# Patient Record
Sex: FEMALE
[De-identification: names, ages, dates, MRNs, and addresses within clinical notes are randomized per-mention and may not be internally consistent; named-entity substitution may affect disease eponyms.]

---

## 2017-03-15 PROBLEM — Z00.00 ENCOUNTER FOR PREVENTIVE HEALTH EXAMINATION: Noted: 2017-03-15

## 2017-03-17 ENCOUNTER — APPOINTMENT (OUTPATIENT)
Dept: COLORECTAL SURGERY | Facility: CLINIC | Age: 67
End: 2017-03-17

## 2017-07-21 ENCOUNTER — OUTPATIENT (OUTPATIENT)
Dept: OUTPATIENT SERVICES | Facility: HOSPITAL | Age: 67
LOS: 1 days | End: 2017-07-21
Payer: MEDICARE

## 2017-07-21 ENCOUNTER — TRANSCRIPTION ENCOUNTER (OUTPATIENT)
Age: 67
End: 2017-07-21

## 2017-07-21 DIAGNOSIS — Z12.11 ENCOUNTER FOR SCREENING FOR MALIGNANT NEOPLASM OF COLON: ICD-10-CM

## 2017-07-21 PROCEDURE — T1013: CPT

## 2017-07-21 PROCEDURE — 45378 DIAGNOSTIC COLONOSCOPY: CPT

## 2018-03-01 ENCOUNTER — OUTPATIENT (OUTPATIENT)
Dept: OUTPATIENT SERVICES | Facility: HOSPITAL | Age: 68
LOS: 1 days | End: 2018-03-01
Payer: MEDICAID

## 2018-03-01 PROCEDURE — G9001: CPT

## 2018-03-02 ENCOUNTER — INPATIENT (INPATIENT)
Facility: HOSPITAL | Age: 68
LOS: 0 days | Discharge: HOME | DRG: 312 | End: 2018-03-03
Attending: HOSPITALIST | Admitting: HOSPITALIST
Payer: MEDICARE

## 2018-03-02 VITALS
RESPIRATION RATE: 19 BRPM | HEIGHT: 60 IN | TEMPERATURE: 98 F | HEART RATE: 101 BPM | SYSTOLIC BLOOD PRESSURE: 101 MMHG | DIASTOLIC BLOOD PRESSURE: 73 MMHG | WEIGHT: 126.1 LBS | OXYGEN SATURATION: 94 %

## 2018-03-02 DIAGNOSIS — R55 SYNCOPE AND COLLAPSE: ICD-10-CM

## 2018-03-02 DIAGNOSIS — Z90.710 ACQUIRED ABSENCE OF BOTH CERVIX AND UTERUS: Chronic | ICD-10-CM

## 2018-03-02 LAB
ALBUMIN SERPL ELPH-MCNC: 4.1 G/DL — SIGNIFICANT CHANGE UP (ref 3.3–5.2)
ALP SERPL-CCNC: 66 U/L — SIGNIFICANT CHANGE UP (ref 40–120)
ALT FLD-CCNC: 12 U/L — SIGNIFICANT CHANGE UP
ANION GAP SERPL CALC-SCNC: 15 MMOL/L — SIGNIFICANT CHANGE UP (ref 5–17)
AST SERPL-CCNC: 19 U/L — SIGNIFICANT CHANGE UP
BASOPHILS # BLD AUTO: 0 K/UL — SIGNIFICANT CHANGE UP (ref 0–0.2)
BASOPHILS NFR BLD AUTO: 0.1 % — SIGNIFICANT CHANGE UP (ref 0–2)
BILIRUB SERPL-MCNC: 0.7 MG/DL — SIGNIFICANT CHANGE UP (ref 0.4–2)
BUN SERPL-MCNC: 11 MG/DL — SIGNIFICANT CHANGE UP (ref 8–20)
CALCIUM SERPL-MCNC: 8.9 MG/DL — SIGNIFICANT CHANGE UP (ref 8.6–10.2)
CHLORIDE SERPL-SCNC: 94 MMOL/L — LOW (ref 98–107)
CK SERPL-CCNC: 77 U/L — SIGNIFICANT CHANGE UP (ref 25–170)
CO2 SERPL-SCNC: 23 MMOL/L — SIGNIFICANT CHANGE UP (ref 22–29)
CREAT SERPL-MCNC: 0.57 MG/DL — SIGNIFICANT CHANGE UP (ref 0.5–1.3)
EOSINOPHIL # BLD AUTO: 0 K/UL — SIGNIFICANT CHANGE UP (ref 0–0.5)
EOSINOPHIL NFR BLD AUTO: 0 % — SIGNIFICANT CHANGE UP (ref 0–6)
GLUCOSE SERPL-MCNC: 108 MG/DL — SIGNIFICANT CHANGE UP (ref 70–115)
HCT VFR BLD CALC: 42 % — SIGNIFICANT CHANGE UP (ref 37–47)
HGB BLD-MCNC: 14.6 G/DL — SIGNIFICANT CHANGE UP (ref 12–16)
LYMPHOCYTES # BLD AUTO: 0.6 K/UL — LOW (ref 1–4.8)
LYMPHOCYTES # BLD AUTO: 6.2 % — LOW (ref 20–55)
MCHC RBC-ENTMCNC: 29.6 PG — SIGNIFICANT CHANGE UP (ref 27–31)
MCHC RBC-ENTMCNC: 34.8 G/DL — SIGNIFICANT CHANGE UP (ref 32–36)
MCV RBC AUTO: 85.2 FL — SIGNIFICANT CHANGE UP (ref 81–99)
MONOCYTES # BLD AUTO: 0.7 K/UL — SIGNIFICANT CHANGE UP (ref 0–0.8)
MONOCYTES NFR BLD AUTO: 6.4 % — SIGNIFICANT CHANGE UP (ref 3–10)
NEUTROPHILS # BLD AUTO: 9.1 K/UL — HIGH (ref 1.8–8)
NEUTROPHILS NFR BLD AUTO: 86.9 % — HIGH (ref 37–73)
NT-PROBNP SERPL-SCNC: 180 PG/ML — SIGNIFICANT CHANGE UP (ref 0–300)
PLATELET # BLD AUTO: 194 K/UL — SIGNIFICANT CHANGE UP (ref 150–400)
POTASSIUM SERPL-MCNC: 3.8 MMOL/L — SIGNIFICANT CHANGE UP (ref 3.5–5.3)
POTASSIUM SERPL-SCNC: 3.8 MMOL/L — SIGNIFICANT CHANGE UP (ref 3.5–5.3)
PROT SERPL-MCNC: 7.2 G/DL — SIGNIFICANT CHANGE UP (ref 6.6–8.7)
RBC # BLD: 4.93 M/UL — SIGNIFICANT CHANGE UP (ref 4.4–5.2)
RBC # FLD: 12.8 % — SIGNIFICANT CHANGE UP (ref 11–15.6)
SODIUM SERPL-SCNC: 132 MMOL/L — LOW (ref 135–145)
TROPONIN T SERPL-MCNC: <0.01 NG/ML — SIGNIFICANT CHANGE UP (ref 0–0.06)
WBC # BLD: 10.4 K/UL — SIGNIFICANT CHANGE UP (ref 4.8–10.8)
WBC # FLD AUTO: 10.4 K/UL — SIGNIFICANT CHANGE UP (ref 4.8–10.8)

## 2018-03-02 PROCEDURE — 70450 CT HEAD/BRAIN W/O DYE: CPT | Mod: 26

## 2018-03-02 PROCEDURE — 93010 ELECTROCARDIOGRAM REPORT: CPT

## 2018-03-02 PROCEDURE — 71045 X-RAY EXAM CHEST 1 VIEW: CPT | Mod: 26

## 2018-03-02 PROCEDURE — 99285 EMERGENCY DEPT VISIT HI MDM: CPT | Mod: 25

## 2018-03-02 PROCEDURE — 93306 TTE W/DOPPLER COMPLETE: CPT | Mod: 26

## 2018-03-02 PROCEDURE — 93880 EXTRACRANIAL BILAT STUDY: CPT | Mod: 26

## 2018-03-02 PROCEDURE — 99223 1ST HOSP IP/OBS HIGH 75: CPT

## 2018-03-02 RX ORDER — SODIUM CHLORIDE 9 MG/ML
1000 INJECTION INTRAMUSCULAR; INTRAVENOUS; SUBCUTANEOUS
Qty: 0 | Refills: 0 | Status: DISCONTINUED | OUTPATIENT
Start: 2018-03-02 | End: 2018-03-03

## 2018-03-02 RX ORDER — OXYBUTYNIN CHLORIDE 5 MG
1 TABLET ORAL
Qty: 0 | Refills: 0 | COMMUNITY

## 2018-03-02 RX ORDER — OXYBUTYNIN CHLORIDE 5 MG
5 TABLET ORAL
Qty: 0 | Refills: 0 | Status: DISCONTINUED | OUTPATIENT
Start: 2018-03-02 | End: 2018-03-03

## 2018-03-02 RX ORDER — SODIUM CHLORIDE 9 MG/ML
3 INJECTION INTRAMUSCULAR; INTRAVENOUS; SUBCUTANEOUS ONCE
Qty: 0 | Refills: 0 | Status: COMPLETED | OUTPATIENT
Start: 2018-03-02 | End: 2018-03-02

## 2018-03-02 RX ADMIN — Medication 5 MILLIGRAM(S): at 19:17

## 2018-03-02 RX ADMIN — SODIUM CHLORIDE 125 MILLILITER(S): 9 INJECTION INTRAMUSCULAR; INTRAVENOUS; SUBCUTANEOUS at 15:39

## 2018-03-02 RX ADMIN — SODIUM CHLORIDE 125 MILLILITER(S): 9 INJECTION INTRAMUSCULAR; INTRAVENOUS; SUBCUTANEOUS at 21:11

## 2018-03-02 RX ADMIN — SODIUM CHLORIDE 3 MILLILITER(S): 9 INJECTION INTRAMUSCULAR; INTRAVENOUS; SUBCUTANEOUS at 09:13

## 2018-03-02 NOTE — ED ADULT NURSE NOTE - OBJECTIVE STATEMENT
Pt care assumed at 0915, presents to ED A&Ox3 c/o syncopal episode this AM. Pt reports she was getting up off the toilet to grab a roll of toilet paper and felt shaky, states she passed out. Pt denies hitting head, blurry vision, or weakness. No obvious trauma or injury present. Pt also reports intermittent left sided chest pain, non radiating x2 months. Denies having active CP or SOB today. Pt states " I had a tube put in my bladder this week by Dr. Heredia but it was taken out." Pt currently taking oxybutynin and cipro, prescribed by Dr. Heredia. Pt in no apparent distress at this time, respirations even and unlabored. Pt rec'd with #20g in place to right ac, intact and patent, lab results pending. Will continue to monitor and reassess.

## 2018-03-02 NOTE — H&P ADULT - HISTORY OF PRESENT ILLNESS
67 yof with pmh of bladder spasms presents from home with syncope for a few minutes. Patient states she was home alone and was urinating in the bathroom and stood up and felt dizzy. Patient then started walking towards another room and fainted for an unknown period of time. Patient denies any chest pain, head trauma or recent illnesess. Patient states that this has never happened to her before. Patient states she did not eat breakfast this morning. Patient seen at bedside and the only complaint is headache.     ct head - negative  ekg - negative

## 2018-03-02 NOTE — ED ADULT NURSE REASSESSMENT NOTE - NS ED NURSE REASSESS COMMENT FT1
Pt resting comfortably on stretcher, in no apparent distress, pending CT results. Will continue to monitor and reasses.

## 2018-03-02 NOTE — ED ADULT NURSE REASSESSMENT NOTE - NS ED NURSE REASSESS COMMENT FT1
Assuming care from previous RN, pt A&O x's 4, resp even and unlabored, LS clear and equal B/L, showing NSR on monitor, denies pain, denies dizziness, offers no complaints at this time, pt with patent 20G IV tolerating 125mL NS well, pt ambulates without difficulty, pt placed on transport cardiac monitor and awaiting transport to admit bed

## 2018-03-02 NOTE — ED ADULT NURSE REASSESSMENT NOTE - NS ED NURSE REASSESS COMMENT FT1
Pt returned from sono, resting comfortably on stretcher, in no apparent distress. Pt denies any pain or discomfort at this time, offers no questions or complaints, respirations even and unlabored. Will continue to monitor and reassess.

## 2018-03-02 NOTE — ED ADULT TRIAGE NOTE - CHIEF COMPLAINT QUOTE
was in the bathroom and reached for toilet paper and felt dizzy and fell, thinks she passed out, happ , 1 1/2 hr ago

## 2018-03-02 NOTE — H&P ADULT - ASSESSMENT
1) Syncope --> likely orthostatic hypotension   --> admit to monitored bed  --> will consult cardio  --> send trops in am  --> tte and carotids  --> fluids    2) Bladder spasms --> oxybutynin  --> urinalysis and urine culture     3) diet --> regular diet    4) pt consult

## 2018-03-02 NOTE — ED PROVIDER NOTE - OBJECTIVE STATEMENT
Patient is a 67 year old female c/o syncope this AM. Patient states that she stood up from the toilet to change the toilet paper and passed out. She states that her "body felt shaky" and that her vision went yellow. Patient also c/o L-sided intermittent CP x 2 months which she attributes to gas. Also c/o H/A x 2 weeks including this AM prior to syncope. Patient reports that she went to the urologist last week for vaginal dryness and he performed an exam where he "put water into the bladder" and she has been having trouble urinating since Wednesday. She was prescribed oxybutinin and cipro. Denies head injury, blurry vision, SOB, dizziness, palpitations, abdominal pain, paresthesias.

## 2018-03-02 NOTE — H&P ADULT - NSHPPHYSICALEXAM_GEN_ALL_CORE
PHYSICAL EXAM:    GENERAL: NAD, well-groomed,   HEAD:  Atraumatic, Normocephalic  EYES: EOMI, PERRLA,   ENMT: No tonsillar erythema, exudates, or enlargement; Moist mucous membranes,   NECK: Supple, No JVD, Normal thyroid  NERVOUS SYSTEM:  Alert & Oriented X3, Good concentration;  CHEST/LUNG: Clear to percussion bilaterally; No rales  HEART: Regular rate and rhythm; No murmurs,  ABDOMEN: Soft, Nontender, Nondistended;  EXTREMITIES:  2+ Peripheral Pulses, No edema  LYMPH: No lymphadenopathy noted  SKIN: No rashes or lesions

## 2018-03-02 NOTE — H&P ADULT - NSHPLABSRESULTS_GEN_ALL_CORE
14.6   10.4   )----------(  194       ( 02 Mar 2018 08:57 )               42.0      132    |  94     |  11.0   ----------------------------<  108        ( 02 Mar 2018 08:57 )  3.8     |  23.0   |  0.57     Ca    8.9        ( 02 Mar 2018 08:57 )    TPro  7.2    /  Alb  4.1    /  TBili  0.7    /  DBili  x      /  AST  19     /  ALT  12     /  AlkPhos  66     ( 02 Mar 2018 08:57 )    LIVER FUNCTIONS - ( 02 Mar 2018 08:57 )  Alb: 4.1 g/dL / Pro: 7.2 g/dL / ALK PHOS: 66 U/L / ALT: 12 U/L / AST: 19 U/L / GGT: x               CAPILLARY BLOOD GLUCOSE    CARDIAC MARKERS ( 02 Mar 2018 08:57 )  x     / <0.01 ng/mL / 77 U/L / x     / x          ekg no st changes

## 2018-03-02 NOTE — ED ADULT NURSE REASSESSMENT NOTE - NS ED NURSE REASSESS COMMENT FT1
Patient continues to rest in bed at this time with family at the bedside. Patient denies any pain at this time. MD at the bedside with patient. NAD noted. Updated on progress.

## 2018-03-03 ENCOUNTER — TRANSCRIPTION ENCOUNTER (OUTPATIENT)
Age: 68
End: 2018-03-03

## 2018-03-03 VITALS
TEMPERATURE: 98 F | DIASTOLIC BLOOD PRESSURE: 69 MMHG | HEART RATE: 73 BPM | OXYGEN SATURATION: 98 % | SYSTOLIC BLOOD PRESSURE: 102 MMHG | RESPIRATION RATE: 17 BRPM

## 2018-03-03 LAB
APPEARANCE UR: CLEAR — SIGNIFICANT CHANGE UP
BACTERIA # UR AUTO: ABNORMAL
BILIRUB UR-MCNC: NEGATIVE — SIGNIFICANT CHANGE UP
COLOR SPEC: YELLOW — SIGNIFICANT CHANGE UP
DIFF PNL FLD: ABNORMAL
EPI CELLS # UR: SIGNIFICANT CHANGE UP
GLUCOSE UR QL: NEGATIVE MG/DL — SIGNIFICANT CHANGE UP
KETONES UR-MCNC: ABNORMAL
LEUKOCYTE ESTERASE UR-ACNC: ABNORMAL
NITRITE UR-MCNC: POSITIVE
PH UR: 6.5 — SIGNIFICANT CHANGE UP (ref 5–8)
PROCALCITONIN SERPL-MCNC: 0.07 NG/ML — HIGH (ref 0–0.04)
PROT UR-MCNC: 30 MG/DL
RBC CASTS # UR COMP ASSIST: ABNORMAL /HPF (ref 0–4)
SP GR SPEC: 1.01 — SIGNIFICANT CHANGE UP (ref 1.01–1.02)
TROPONIN T SERPL-MCNC: <0.01 NG/ML — SIGNIFICANT CHANGE UP (ref 0–0.06)
UROBILINOGEN FLD QL: NEGATIVE MG/DL — SIGNIFICANT CHANGE UP
WBC UR QL: ABNORMAL

## 2018-03-03 PROCEDURE — 83880 ASSAY OF NATRIURETIC PEPTIDE: CPT

## 2018-03-03 PROCEDURE — 99239 HOSP IP/OBS DSCHRG MGMT >30: CPT

## 2018-03-03 PROCEDURE — T1013: CPT

## 2018-03-03 PROCEDURE — 87086 URINE CULTURE/COLONY COUNT: CPT

## 2018-03-03 PROCEDURE — 87186 SC STD MICRODIL/AGAR DIL: CPT

## 2018-03-03 PROCEDURE — 84145 PROCALCITONIN (PCT): CPT

## 2018-03-03 PROCEDURE — 36415 COLL VENOUS BLD VENIPUNCTURE: CPT

## 2018-03-03 PROCEDURE — 71045 X-RAY EXAM CHEST 1 VIEW: CPT

## 2018-03-03 PROCEDURE — 99285 EMERGENCY DEPT VISIT HI MDM: CPT | Mod: 25

## 2018-03-03 PROCEDURE — 85027 COMPLETE CBC AUTOMATED: CPT

## 2018-03-03 PROCEDURE — 93005 ELECTROCARDIOGRAM TRACING: CPT

## 2018-03-03 PROCEDURE — 82550 ASSAY OF CK (CPK): CPT

## 2018-03-03 PROCEDURE — 70450 CT HEAD/BRAIN W/O DYE: CPT

## 2018-03-03 PROCEDURE — 93306 TTE W/DOPPLER COMPLETE: CPT

## 2018-03-03 PROCEDURE — 93880 EXTRACRANIAL BILAT STUDY: CPT

## 2018-03-03 PROCEDURE — 80053 COMPREHEN METABOLIC PANEL: CPT

## 2018-03-03 PROCEDURE — 84484 ASSAY OF TROPONIN QUANT: CPT

## 2018-03-03 PROCEDURE — 93306 TTE W/DOPPLER COMPLETE: CPT | Mod: 26

## 2018-03-03 PROCEDURE — 81001 URINALYSIS AUTO W/SCOPE: CPT

## 2018-03-03 RX ORDER — CEFTRIAXONE 500 MG/1
INJECTION, POWDER, FOR SOLUTION INTRAMUSCULAR; INTRAVENOUS
Qty: 0 | Refills: 0 | Status: DISCONTINUED | OUTPATIENT
Start: 2018-03-03 | End: 2018-03-03

## 2018-03-03 RX ORDER — CEFTRIAXONE 500 MG/1
1 INJECTION, POWDER, FOR SOLUTION INTRAMUSCULAR; INTRAVENOUS EVERY 24 HOURS
Qty: 0 | Refills: 0 | Status: DISCONTINUED | OUTPATIENT
Start: 2018-03-04 | End: 2018-03-03

## 2018-03-03 RX ORDER — MOXIFLOXACIN HYDROCHLORIDE TABLETS, 400 MG 400 MG/1
1 TABLET, FILM COATED ORAL
Qty: 0 | Refills: 0 | COMMUNITY

## 2018-03-03 RX ORDER — CEFTRIAXONE 500 MG/1
1 INJECTION, POWDER, FOR SOLUTION INTRAMUSCULAR; INTRAVENOUS ONCE
Qty: 0 | Refills: 0 | Status: COMPLETED | OUTPATIENT
Start: 2018-03-03 | End: 2018-03-03

## 2018-03-03 RX ORDER — SACCHAROMYCES BOULARDII 250 MG
250 POWDER IN PACKET (EA) ORAL
Qty: 0 | Refills: 0 | Status: DISCONTINUED | OUTPATIENT
Start: 2018-03-03 | End: 2018-03-03

## 2018-03-03 RX ADMIN — Medication 5 MILLIGRAM(S): at 06:37

## 2018-03-03 RX ADMIN — SODIUM CHLORIDE 125 MILLILITER(S): 9 INJECTION INTRAMUSCULAR; INTRAVENOUS; SUBCUTANEOUS at 10:31

## 2018-03-03 RX ADMIN — Medication 5 MILLIGRAM(S): at 16:55

## 2018-03-03 RX ADMIN — CEFTRIAXONE 100 GRAM(S): 500 INJECTION, POWDER, FOR SOLUTION INTRAMUSCULAR; INTRAVENOUS at 10:30

## 2018-03-03 NOTE — DISCHARGE NOTE ADULT - MEDICATION SUMMARY - MEDICATIONS TO STOP TAKING
I will STOP taking the medications listed below when I get home from the hospital:    Zofran ODT 4 mg oral tablet, disintegrating  -- 1 tab(s) by mouth 3 times a day    Cipro 500 mg oral tablet  -- 1 tab(s) by mouth every 12 hours

## 2018-03-03 NOTE — DISCHARGE NOTE ADULT - PATIENT PORTAL LINK FT
You can access the [facility] Patient Portal, offered by [facility], by registering with the following website: [email or web address]

## 2018-03-03 NOTE — DISCHARGE NOTE ADULT - MEDICATION SUMMARY - MEDICATIONS TO TAKE
I will START or STAY ON the medications listed below when I get home from the hospital:    Levaquin 750 mg oral tablet  -- 1 tab(s) by mouth every 24 hours   -- Avoid prolonged or excessive exposure to direct and/or artificial sunlight while taking this medication.  Do not take dairy products, antacids, or iron preparations within one hour of this medication.  Finish all this medication unless otherwise directed by prescriber.  May cause drowsiness or dizziness.  Medication should be taken with plenty of water.    -- Indication: For uti    oxybutynin 5 mg oral tablet  -- 1 tab(s) by mouth 2 times a day  -- Indication: For bladder spasms

## 2018-03-03 NOTE — DISCHARGE NOTE ADULT - HOSPITAL COURSE
67 yof with pmh of bladder spasms presents from home with syncope for a few minutes. Patient states she was home alone and was urinating in the bathroom and stood up and felt dizzy. Patient then started walking towards another room and fainted for an unknown period of time. Patient denies any chest pain, head trauma or recent illnesess. Patient states that this has never happened to her before. Patient states she did not eat breakfast this morning. Patient seen at bedside and the only complaint is headache.     ct head - negative  ekg - negative    patient admitted to medicine and seen by cardio. patient had carotids and tte. Patient had pos ua and started on iv abx. Patient is now ready for dc home    time spent on dc 32 minutes

## 2018-03-03 NOTE — DISCHARGE NOTE ADULT - CARE PLAN
Principal Discharge DX:	Syncope and collapse  Goal:	tte and carotids negative  Assessment and plan of treatment:	follow up with cardio and pcp  Secondary Diagnosis:	UTI (urinary tract infection)  Goal:	dc with po abx  Secondary Diagnosis:	Bladder spasm  Goal:	home meds

## 2018-03-03 NOTE — DISCHARGE NOTE ADULT - CARE PROVIDER_API CALL
Mert In Juan CLIFTON), Medicine  [address] [place] [address]  Phone: [phone]  Fax: [phone]    Devi Kasper (JESSIE), Cardiovascular Disease; Internal Medicine; Nuclear Cardiology  [address] [place] [address]  Phone: [phone]  Fax: [phone]    Cristino Heredia), Urology  [address] [place] [id number]  Phone: [phone]  Fax: [phone]

## 2018-03-19 DIAGNOSIS — R69 ILLNESS, UNSPECIFIED: ICD-10-CM

## 2019-06-24 PROBLEM — K76.89 OTHER SPECIFIED DISEASES OF LIVER: Chronic | Status: ACTIVE | Noted: 2018-03-02

## 2019-07-11 ENCOUNTER — APPOINTMENT (OUTPATIENT)
Dept: VASCULAR SURGERY | Facility: CLINIC | Age: 69
End: 2019-07-11

## 2022-02-07 NOTE — CONSULT NOTE ADULT - SUBJECTIVE AND OBJECTIVE BOX
[place] [facility] - [facility]                                   [address] [place] [address]                                                      PHONE: [phone]                                                         FAX: [phone]  [email or web address]  ---------------------------------------------------------------------------------------------------------------------------------    67y Female with past medical history as under presenting after a syncopal episode at home. Used bathroom and as she was walking out she passed out. Denies cp/sob. No previous history of syncope.     PAST MEDICAL & SURGICAL HISTORY:  Hepatic cyst  Osteoporosis  S/P hysterectomy      No Known Allergies      MEDICATIONS  (STANDING):  oxybutynin 5 milliGRAM(s) Oral two times a day  sodium chloride 0.9%. 1000 milliLiter(s) (125 mL/Hr) IV Continuous <Continuous>    MEDICATIONS  (PRN):      Social History:  No smoking   No alcohol  No     ROS: Negative other than as mentioned in HPI.    Vital Signs Last 24 Hrs  T(C): 37.3 (02 Mar 2018 13:59), Max: 37.8 (02 Mar 2018 09:38)  T(F): 99.2 (02 Mar 2018 13:59), Max: 100 (02 Mar 2018 09:38)  HR: 76 (02 Mar 2018 13:59) (76 - 101)  BP: 110/76 (02 Mar 2018 13:59) (101/73 - 110/76)  BP(mean): --  RR: 18 (02 Mar 2018 13:59) (18 - 19)  SpO2: 99% (02 Mar 2018 13:59) (94% - 99%)  ICU Vital Signs Last 24 Hrs  SERGIO BUCK  I&O's Detail    I&O's Summary    Drug Dosing Weight  SERGIO BUCK      PHYSICAL EXAM:    HEENT:  No JVD  CARDIOVASCULAR: Normal S1 and S2, No murmur, rub, lift.   LUNGS: No rales, rhonchi or wheeze. Normal breath sounds bilaterally.  ABDOMEN: Soft, nontender without mass or organomegaly. bowel sounds normoactive.  EXTREMITIES: No clubbing, cyanosis or edema          LABS:                        14.6   10.4  )-----------( 194      ( 02 Mar 2018 08:57 )             42.0     03-02    132<L>  |  94<L>  |  11.0  ----------------------------<  108  3.8   |  23.0  |  0.57    Ca    8.9      02 Mar 2018 08:57    TPro  7.2  /  Alb  4.1  /  TBili  0.7  /  DBili  x   /  AST  19  /  ALT  12  /  AlkPhos  66  03-02    SERGIO BUCK  CARDIAC MARKERS ( 02 Mar 2018 08:57 )  x     / <0.01 ng/mL / 77 U/L / x     / x          ecg: nsr with poor r wave progression  Carotid u/s: negative      Assessment and Plan:  In summary, SERGIO BUCK is an 67y Female with no significant past medical history presenting after a syncopal episode at home. Used bathroom and as she was walking out she passed out. Denies cp/sob. No previous history of syncope.   Syncope: continue on tele. will check echo [place] [facility] - [facility]                                   [address] [place] [address]                                                      PHONE: [phone]                                                         FAX: [phone]  [email or web address]  ---------------------------------------------------------------------------------------------------------------------------------    67y Female with past medical history as under presenting after a syncopal episode at home. Used bathroom and as she was walking out she passed out. Denies cp/sob. No previous history of syncope.     PAST MEDICAL & SURGICAL HISTORY:  Hepatic cyst  Osteoporosis  S/P hysterectomy      No Known Allergies      MEDICATIONS  (STANDING):  oxybutynin 5 milliGRAM(s) Oral two times a day  sodium chloride 0.9%. 1000 milliLiter(s) (125 mL/Hr) IV Continuous <Continuous>    MEDICATIONS  (PRN):      Social History:  No smoking   No alcohol  No     ROS: Negative other than as mentioned in HPI.    Vital Signs Last 24 Hrs  T(C): 37.3 (02 Mar 2018 13:59), Max: 37.8 (02 Mar 2018 09:38)  T(F): 99.2 (02 Mar 2018 13:59), Max: 100 (02 Mar 2018 09:38)  HR: 76 (02 Mar 2018 13:59) (76 - 101)  BP: 110/76 (02 Mar 2018 13:59) (101/73 - 110/76)  BP(mean): --  RR: 18 (02 Mar 2018 13:59) (18 - 19)  SpO2: 99% (02 Mar 2018 13:59) (94% - 99%)  ICU Vital Signs Last 24 Hrs  SERGIO BUCK  I&O's Detail    I&O's Summary    Drug Dosing Weight  SERGIO BUCK      PHYSICAL EXAM:    HEENT:  No JVD  CARDIOVASCULAR: Normal S1 and S2, No murmur, rub, lift.   LUNGS: No rales, rhonchi or wheeze. Normal breath sounds bilaterally.  ABDOMEN: Soft, nontender without mass or organomegaly. bowel sounds normoactive.  EXTREMITIES: No clubbing, cyanosis or edema          LABS:                        14.6   10.4  )-----------( 194      ( 02 Mar 2018 08:57 )             42.0     03-02    132<L>  |  94<L>  |  11.0  ----------------------------<  108  3.8   |  23.0  |  0.57    Ca    8.9      02 Mar 2018 08:57    TPro  7.2  /  Alb  4.1  /  TBili  0.7  /  DBili  x   /  AST  19  /  ALT  12  /  AlkPhos  66  03-02    SERGIO BUCK  CARDIAC MARKERS ( 02 Mar 2018 08:57 )  x     / <0.01 ng/mL / 77 U/L / x     / x          ecg: nsr with poor r wave progression  Carotid u/s: negative  Assessment and Plan:  In summary, SERGIO BUCK is an 67y Female with no significant past medical history presenting after a syncopal episode at home. As she was walking out of the bath room she passed out. Denies cp/sob. No previous history of syncope.   Syncope: continue on tele. will check echo With patient

## 2022-02-14 ENCOUNTER — APPOINTMENT (OUTPATIENT)
Dept: OBGYN | Facility: CLINIC | Age: 72
End: 2022-02-14

## 2022-09-21 ENCOUNTER — APPOINTMENT (OUTPATIENT)
Dept: OBGYN | Facility: CLINIC | Age: 72
End: 2022-09-21

## 2022-09-21 VITALS
HEIGHT: 60 IN | BODY MASS INDEX: 24.74 KG/M2 | WEIGHT: 126 LBS | SYSTOLIC BLOOD PRESSURE: 130 MMHG | DIASTOLIC BLOOD PRESSURE: 80 MMHG

## 2022-09-21 DIAGNOSIS — Z12.4 ENCOUNTER FOR SCREENING FOR MALIGNANT NEOPLASM OF CERVIX: ICD-10-CM

## 2022-09-21 DIAGNOSIS — Z80.42 FAMILY HISTORY OF MALIGNANT NEOPLASM OF PROSTATE: ICD-10-CM

## 2022-09-21 DIAGNOSIS — Z78.9 OTHER SPECIFIED HEALTH STATUS: ICD-10-CM

## 2022-09-21 DIAGNOSIS — N81.10 CYSTOCELE, UNSPECIFIED: ICD-10-CM

## 2022-09-21 DIAGNOSIS — Z83.3 FAMILY HISTORY OF DIABETES MELLITUS: ICD-10-CM

## 2022-09-21 DIAGNOSIS — Z12.31 ENCOUNTER FOR SCREENING MAMMOGRAM FOR MALIGNANT NEOPLASM OF BREAST: ICD-10-CM

## 2022-09-21 PROCEDURE — 99387 INIT PM E/M NEW PAT 65+ YRS: CPT

## 2022-09-21 PROCEDURE — G0101: CPT

## 2022-09-21 RX ORDER — DIAPER,BRIEF,INFANT-TODD,DISP
EACH MISCELLANEOUS
Refills: 0 | Status: ACTIVE | COMMUNITY

## 2022-09-21 NOTE — DISCUSSION/SUMMARY
[FreeTextEntry1] : We reviewed guidelines for pap smears, pt is opting to have pap smear this year. Will call pt to review results\par \par RX for mammogram provided, to be completed 1 year from last mammo\par \par Referral provided for uro/gyn to evaluate possible vaginal prolapse. Discussed with pt explaining vaginal prolapse, all questions addressed\par \par Continue vit d and calcium, healthy diet and exercise \par \par FU as needed

## 2022-09-21 NOTE — HISTORY OF PRESENT ILLNESS
[LMP unknown] : LMP unknown [N] : Patient is not sexually active [Y] : Positive pregnancy history [unknown] : Patient is unsure of the date of her LMP [Menarche Age: ____] : age at menarche was [unfilled] [Previously active] : previously active [FreeTextEntry1] :  Marybeth [id number]\par \par Deborah is a 72 y.o.  LMP unknown (hysterectomy at age 40) here today to establish care/annual exam. She has concerns that her vagina feels like it is falling out. She denies any bowel or urinary concerns. Denies any urine leakage \par \par Her last mammogram was 2022 per patient WNL, denies abnormal mammograms \par \par Last pap was  per patient \par \par Her last colonoscopy was  \par \par She follows with primary care, takes calcium, vitamin d, multi vitamins  [Mammogramdate] : 01/2022 [TextBox_19] : AS PER PATIENT  [PapSmeardate] : 2014 [TextBox_31] : AS PER PATIENT  [ColonoscopyDate] : 2013 [TextBox_43] : AS PER PATIENT [PGxTotal] : 7 [[facility]Full[facility]] : 7 [[facility]Living] : 7 [Post-Menopause, No Sxs] : post-menopausal, currently without symptoms [No] : No

## 2022-09-21 NOTE — PHYSICAL EXAM
[Chaperone Present] : A chaperone was present in the examining room during all aspects of the physical examination [FreeTextEntry1] : Clare ROMERO  [Appropriately responsive] : appropriately responsive [Alert] : alert [No Acute Distress] : no acute distress [Oriented x3] : oriented x3 [Examination Of The Breasts] : a normal appearance [No Masses] : no breast masses were palpable [Labia Majora] : normal [Labia Minora] : normal [Normal] : normal [Atrophy] : atrophy [Absent] : absent [Uterine Adnexae] : non-palpable [FreeTextEntry4] : vaginal prolapse  [FreeTextEntry8] : wnl

## 2022-09-21 NOTE — REVIEW OF SYSTEMS
[Patient Intake Form Reviewed] : Patient intake form was reviewed [Negative] : Heme/Lymph [FreeTextEntry8] : vaginal prolapse

## 2022-09-22 LAB — HPV HIGH+LOW RISK DNA PNL CVX: NOT DETECTED

## 2022-09-28 LAB — CYTOLOGY CVX/VAG DOC THIN PREP: ABNORMAL

## 2024-01-16 ENCOUNTER — OFFICE (OUTPATIENT)
Dept: URBAN - METROPOLITAN AREA CLINIC 112 | Facility: CLINIC | Age: 74
Setting detail: OPHTHALMOLOGY
End: 2024-01-16
Payer: COMMERCIAL

## 2024-01-16 DIAGNOSIS — H25.13: ICD-10-CM

## 2024-01-16 DIAGNOSIS — H43.393: ICD-10-CM

## 2024-01-16 DIAGNOSIS — H18.513: ICD-10-CM

## 2024-01-16 DIAGNOSIS — H16.223: ICD-10-CM

## 2024-01-16 PROCEDURE — 99204 OFFICE O/P NEW MOD 45 MIN: CPT | Performed by: OPHTHALMOLOGY

## 2024-01-16 PROCEDURE — 83861 MICROFLUID ANALY TEARS: CPT | Performed by: OPHTHALMOLOGY

## 2024-01-16 PROCEDURE — 92250 FUNDUS PHOTOGRAPHY W/I&R: CPT | Performed by: OPHTHALMOLOGY

## 2024-01-16 ASSESSMENT — REFRACTION_AUTOREFRACTION
OS_AXIS: 113
OD_AXIS: 093
OD_CYLINDER: -1.00
OD_SPHERE: +2.75
OS_SPHERE: +2.75
OS_CYLINDER: -1.50

## 2024-01-16 ASSESSMENT — CONFRONTATIONAL VISUAL FIELD TEST (CVF)
OD_FINDINGS: FULL
OS_FINDINGS: FULL

## 2024-01-16 ASSESSMENT — REFRACTION_MANIFEST
OD_SPHERE: +1.75
OD_CYLINDER: -0.75
OD_AXIS: 90
OS_SPHERE: +2.00
OS_VA1: 20/50+2
OS_AXIS: 105
OD_VA1: 20/40-1
OS_CYLINDER: -1.00

## 2024-01-16 ASSESSMENT — CORNEAL DYSTROPHY - POSTERIOR
OD_POSTERIORDYSTROPHY: GUTTATA
OS_POSTERIORDYSTROPHY: GUTTATA

## 2024-01-16 ASSESSMENT — SPHEQUIV_DERIVED
OD_SPHEQUIV: 2.25
OD_SPHEQUIV: 1.375
OS_SPHEQUIV: 1.5
OS_SPHEQUIV: 2

## 2024-01-16 ASSESSMENT — TEAR BREAK UP TIME (TBUT)
OS_TBUT: 1+
OD_TBUT: 1+

## 2024-02-09 ENCOUNTER — OFFICE (OUTPATIENT)
Dept: URBAN - METROPOLITAN AREA CLINIC 94 | Facility: CLINIC | Age: 74
Setting detail: OPHTHALMOLOGY
End: 2024-02-09
Payer: COMMERCIAL

## 2024-02-09 DIAGNOSIS — H25.13: ICD-10-CM

## 2024-02-09 DIAGNOSIS — H16.223: ICD-10-CM

## 2024-02-09 DIAGNOSIS — H43.393: ICD-10-CM

## 2024-02-09 DIAGNOSIS — H18.513: ICD-10-CM

## 2024-02-09 DIAGNOSIS — H25.12: ICD-10-CM

## 2024-02-09 PROCEDURE — 92286 ANT SGM IMG I&R SPECLR MIC: CPT | Performed by: OPHTHALMOLOGY

## 2024-02-09 PROCEDURE — 92136 OPHTHALMIC BIOMETRY: CPT | Performed by: OPHTHALMOLOGY

## 2024-02-09 PROCEDURE — 99213 OFFICE O/P EST LOW 20 MIN: CPT | Performed by: OPHTHALMOLOGY

## 2024-02-09 ASSESSMENT — REFRACTION_AUTOREFRACTION
OD_SPHERE: +3.00
OD_CYLINDER: -1.25
OD_AXIS: 094
OS_CYLINDER: -1.50
OS_SPHERE: +2.50
OS_AXIS: 108

## 2024-02-09 ASSESSMENT — REFRACTION_MANIFEST
OS_AXIS: 105
OD_SPHERE: +1.75
OS_CYLINDER: -1.00
OD_CYLINDER: -0.75
OS_SPHERE: +2.00
OS_VA1: 20/50+2
OD_VA1: 20/40-1
OD_AXIS: 90

## 2024-02-09 ASSESSMENT — SPHEQUIV_DERIVED
OD_SPHEQUIV: 2.375
OD_SPHEQUIV: 1.375
OS_SPHEQUIV: 1.5
OS_SPHEQUIV: 1.75

## 2024-02-09 ASSESSMENT — CONFRONTATIONAL VISUAL FIELD TEST (CVF)
OD_FINDINGS: FULL
OS_FINDINGS: FULL

## 2024-02-09 ASSESSMENT — TEAR BREAK UP TIME (TBUT)
OD_TBUT: 1+
OS_TBUT: 1+

## 2024-02-09 ASSESSMENT — CORNEAL DYSTROPHY - POSTERIOR
OS_POSTERIORDYSTROPHY: GUTTATA
OD_POSTERIORDYSTROPHY: GUTTATA

## 2024-02-20 ENCOUNTER — ASC (OUTPATIENT)
Dept: URBAN - METROPOLITAN AREA SURGERY 8 | Facility: SURGERY | Age: 74
Setting detail: OPHTHALMOLOGY
End: 2024-02-20
Payer: COMMERCIAL

## 2024-02-20 DIAGNOSIS — H52.212: ICD-10-CM

## 2024-02-20 DIAGNOSIS — H25.12: ICD-10-CM

## 2024-02-20 PROCEDURE — 66984 XCAPSL CTRC RMVL W/O ECP: CPT | Mod: LT | Performed by: OPHTHALMOLOGY

## 2024-02-20 PROCEDURE — FEMTO FEMTOSECOND LASER: Mod: GY | Performed by: OPHTHALMOLOGY

## 2024-02-20 PROCEDURE — V2788P PANOPTIX: Performed by: OPHTHALMOLOGY

## 2024-02-21 ENCOUNTER — OFFICE (OUTPATIENT)
Dept: URBAN - METROPOLITAN AREA CLINIC 94 | Facility: CLINIC | Age: 74
Setting detail: OPHTHALMOLOGY
End: 2024-02-21
Payer: COMMERCIAL

## 2024-02-21 ENCOUNTER — RX ONLY (RX ONLY)
Age: 74
End: 2024-02-21

## 2024-02-21 DIAGNOSIS — Z96.1: ICD-10-CM

## 2024-02-21 PROCEDURE — 99024 POSTOP FOLLOW-UP VISIT: CPT | Performed by: PHYSICIAN ASSISTANT

## 2024-02-21 ASSESSMENT — REFRACTION_MANIFEST
OD_SPHERE: +1.75
OS_SPHERE: +2.00
OS_VA1: 20/50+2
OD_CYLINDER: -0.75
OD_VA1: 20/40-1
OS_AXIS: 105
OD_AXIS: 90
OS_CYLINDER: -1.00

## 2024-02-21 ASSESSMENT — TEAR BREAK UP TIME (TBUT)
OD_TBUT: 1+
OS_TBUT: 1+

## 2024-02-21 ASSESSMENT — CONFRONTATIONAL VISUAL FIELD TEST (CVF)
OS_FINDINGS: FULL
OD_FINDINGS: FULL

## 2024-02-21 ASSESSMENT — REFRACTION_AUTOREFRACTION
OS_AXIS: 163
OS_SPHERE: +0.25
OD_CYLINDER: -0.50
OD_SPHERE: +2.50
OS_CYLINDER: -2.00
OD_AXIS: 097

## 2024-02-21 ASSESSMENT — SPHEQUIV_DERIVED
OD_SPHEQUIV: 2.25
OS_SPHEQUIV: -0.75
OD_SPHEQUIV: 1.375
OS_SPHEQUIV: 1.5

## 2024-02-21 ASSESSMENT — CORNEAL EDEMA CLINICAL DESCRIPTION: OS_CORNEALEDEMA: T

## 2024-02-21 ASSESSMENT — CORNEAL DYSTROPHY - POSTERIOR
OS_POSTERIORDYSTROPHY: GUTTATA
OD_POSTERIORDYSTROPHY: GUTTATA

## 2024-02-27 ENCOUNTER — OFFICE (OUTPATIENT)
Dept: URBAN - METROPOLITAN AREA CLINIC 94 | Facility: CLINIC | Age: 74
Setting detail: OPHTHALMOLOGY
End: 2024-02-27
Payer: COMMERCIAL

## 2024-02-27 DIAGNOSIS — Z96.1: ICD-10-CM

## 2024-02-27 DIAGNOSIS — H43.393: ICD-10-CM

## 2024-02-27 DIAGNOSIS — H25.11: ICD-10-CM

## 2024-02-27 PROCEDURE — 99024 POSTOP FOLLOW-UP VISIT: CPT | Performed by: PHYSICIAN ASSISTANT

## 2024-02-27 ASSESSMENT — CORNEAL DYSTROPHY - POSTERIOR
OS_POSTERIORDYSTROPHY: GUTTATA
OD_POSTERIORDYSTROPHY: GUTTATA

## 2024-02-27 ASSESSMENT — REFRACTION_MANIFEST
OD_SPHERE: +1.75
OD_CYLINDER: -0.75
OS_CYLINDER: -1.00
OS_VA1: 20/50+2
OS_VA1: 20/20
OS_SPHERE: +2.00
OS_AXIS: 105
OD_AXIS: 90
OS_SPHERE: PLANO
OD_VA1: 20/40-1

## 2024-02-27 ASSESSMENT — SPHEQUIV_DERIVED
OD_SPHEQUIV: 1.375
OS_SPHEQUIV: 1.5
OS_SPHEQUIV: -0.125
OD_SPHEQUIV: 2

## 2024-02-27 ASSESSMENT — REFRACTION_AUTOREFRACTION
OS_AXIS: 082
OD_AXIS: 089
OS_CYLINDER: -0.25
OD_SPHERE: +2.50
OD_CYLINDER: -1.00
OS_SPHERE: 0.00

## 2024-02-27 ASSESSMENT — CONFRONTATIONAL VISUAL FIELD TEST (CVF)
OD_FINDINGS: FULL
OS_FINDINGS: FULL

## 2024-02-27 ASSESSMENT — TEAR BREAK UP TIME (TBUT)
OS_TBUT: 1+
OD_TBUT: 1+

## 2024-03-11 ENCOUNTER — ASC (OUTPATIENT)
Dept: URBAN - METROPOLITAN AREA SURGERY 8 | Facility: SURGERY | Age: 74
Setting detail: OPHTHALMOLOGY
End: 2024-03-11
Payer: COMMERCIAL

## 2024-03-11 DIAGNOSIS — H52.211: ICD-10-CM

## 2024-03-11 DIAGNOSIS — H25.11: ICD-10-CM

## 2024-03-11 PROCEDURE — V2788P PANOPTIX: Performed by: OPHTHALMOLOGY

## 2024-03-11 PROCEDURE — FEMTO PRECISION LASER CATARACT SURGERY: Mod: GY | Performed by: OPHTHALMOLOGY

## 2024-03-11 PROCEDURE — 66984 XCAPSL CTRC RMVL W/O ECP: CPT | Mod: 79,RT | Performed by: OPHTHALMOLOGY

## 2024-03-12 ENCOUNTER — OFFICE (OUTPATIENT)
Dept: URBAN - METROPOLITAN AREA CLINIC 116 | Facility: CLINIC | Age: 74
Setting detail: OPHTHALMOLOGY
End: 2024-03-12
Payer: COMMERCIAL

## 2024-03-12 ENCOUNTER — RX ONLY (RX ONLY)
Age: 74
End: 2024-03-12

## 2024-03-12 DIAGNOSIS — Z96.1: ICD-10-CM

## 2024-03-12 PROCEDURE — 99024 POSTOP FOLLOW-UP VISIT: CPT | Performed by: PHYSICIAN ASSISTANT

## 2024-03-12 ASSESSMENT — REFRACTION_MANIFEST
OS_AXIS: 105
OS_SPHERE: PLANO
OS_SPHERE: +2.00
OS_VA1: 20/50+2
OD_SPHERE: +1.75
OD_AXIS: 90
OS_VA1: 20/20
OS_CYLINDER: -1.00
OD_VA1: 20/40-1
OD_CYLINDER: -0.75

## 2024-03-12 ASSESSMENT — SPHEQUIV_DERIVED
OD_SPHEQUIV: 1.375
OS_SPHEQUIV: 1.5

## 2024-03-22 ENCOUNTER — OFFICE (OUTPATIENT)
Dept: URBAN - METROPOLITAN AREA CLINIC 112 | Facility: CLINIC | Age: 74
Setting detail: OPHTHALMOLOGY
End: 2024-03-22
Payer: COMMERCIAL

## 2024-03-22 DIAGNOSIS — Z96.1: ICD-10-CM

## 2024-03-22 PROBLEM — H26.491 POSTERIOR CAPSULAR OPACIFICATION; RIGHT EYE: Status: ACTIVE | Noted: 2024-03-12

## 2024-03-22 PROBLEM — H16.223 +SPK; BOTH EYES: Status: ACTIVE | Noted: 2024-03-22

## 2024-03-22 PROCEDURE — 99024 POSTOP FOLLOW-UP VISIT: CPT | Performed by: REGISTERED NURSE

## 2024-03-22 ASSESSMENT — REFRACTION_MANIFEST
OS_SPHERE: +2.00
OS_SPHERE: PLANO
OS_VA1: 20/20
OS_AXIS: 105
OD_AXIS: 90
OD_VA1: 20/40-1
OS_VA1: 20/50+2
OS_CYLINDER: -1.00
OD_CYLINDER: -0.75
OD_SPHERE: +1.75

## 2024-03-22 ASSESSMENT — SPHEQUIV_DERIVED
OD_SPHEQUIV: 1.375
OS_SPHEQUIV: 1.5

## 2024-04-29 ENCOUNTER — OFFICE (OUTPATIENT)
Dept: URBAN - METROPOLITAN AREA CLINIC 112 | Facility: CLINIC | Age: 74
Setting detail: OPHTHALMOLOGY
End: 2024-04-29
Payer: COMMERCIAL

## 2024-04-29 DIAGNOSIS — Z96.1: ICD-10-CM

## 2024-04-29 DIAGNOSIS — H16.223: ICD-10-CM

## 2024-04-29 DIAGNOSIS — H26.493: ICD-10-CM

## 2024-04-29 PROCEDURE — 99024 POSTOP FOLLOW-UP VISIT: CPT | Performed by: REGISTERED NURSE

## 2024-06-18 ENCOUNTER — RX ONLY (RX ONLY)
Age: 74
End: 2024-06-18

## 2024-06-18 ENCOUNTER — OFFICE (OUTPATIENT)
Dept: URBAN - METROPOLITAN AREA CLINIC 112 | Facility: CLINIC | Age: 74
Setting detail: OPHTHALMOLOGY
End: 2024-06-18
Payer: COMMERCIAL

## 2024-06-18 DIAGNOSIS — H16.223: ICD-10-CM

## 2024-06-18 PROCEDURE — 83861 MICROFLUID ANALY TEARS: CPT | Performed by: OPHTHALMOLOGY

## 2024-06-18 PROCEDURE — 99213 OFFICE O/P EST LOW 20 MIN: CPT | Performed by: OPHTHALMOLOGY

## 2024-06-18 ASSESSMENT — CONFRONTATIONAL VISUAL FIELD TEST (CVF)
OS_FINDINGS: FULL
OD_FINDINGS: FULL

## 2024-08-09 ENCOUNTER — RX ONLY (RX ONLY)
Age: 74
End: 2024-08-09

## 2024-08-09 ENCOUNTER — ASC (OUTPATIENT)
Dept: URBAN - METROPOLITAN AREA SURGERY 8 | Facility: SURGERY | Age: 74
Setting detail: OPHTHALMOLOGY
End: 2024-08-09
Payer: COMMERCIAL

## 2024-08-09 DIAGNOSIS — H26.492: ICD-10-CM

## 2024-08-09 PROCEDURE — 66821 AFTER CATARACT LASER SURGERY: CPT | Mod: LT | Performed by: OPHTHALMOLOGY

## 2024-08-14 ENCOUNTER — RX ONLY (RX ONLY)
Age: 74
End: 2024-08-14

## 2024-08-14 ENCOUNTER — ASC (OUTPATIENT)
Dept: URBAN - METROPOLITAN AREA SURGERY 8 | Facility: SURGERY | Age: 74
Setting detail: OPHTHALMOLOGY
End: 2024-08-14
Payer: COMMERCIAL

## 2024-08-14 DIAGNOSIS — H26.491: ICD-10-CM

## 2024-08-14 PROBLEM — H26.493 POSTERIOR CAPSULAR OPACIFICATION; RIGHT EYE, LEFT EYE, BOTH EYES: Status: ACTIVE | Noted: 2024-08-09

## 2024-08-14 PROBLEM — H26.492 POSTERIOR CAPSULAR OPACIFICATION; RIGHT EYE, LEFT EYE, BOTH EYES: Status: ACTIVE | Noted: 2024-08-09

## 2024-08-14 PROCEDURE — 66821 AFTER CATARACT LASER SURGERY: CPT | Mod: 79,RT | Performed by: OPHTHALMOLOGY

## 2024-08-14 ASSESSMENT — CONFRONTATIONAL VISUAL FIELD TEST (CVF)
OD_FINDINGS: FULL
OS_FINDINGS: FULL

## 2024-09-04 ENCOUNTER — OFFICE (OUTPATIENT)
Dept: URBAN - METROPOLITAN AREA CLINIC 116 | Facility: CLINIC | Age: 74
Setting detail: OPHTHALMOLOGY
End: 2024-09-04
Payer: COMMERCIAL

## 2024-09-04 DIAGNOSIS — H26.492: ICD-10-CM

## 2024-09-04 DIAGNOSIS — H26.493: ICD-10-CM

## 2024-09-04 DIAGNOSIS — H26.491: ICD-10-CM

## 2024-09-04 PROCEDURE — 99024 POSTOP FOLLOW-UP VISIT: CPT | Performed by: OPTOMETRIST

## 2024-09-04 ASSESSMENT — CONFRONTATIONAL VISUAL FIELD TEST (CVF)
OS_FINDINGS: FULL
OD_FINDINGS: FULL

## 2024-12-11 ENCOUNTER — APPOINTMENT (OUTPATIENT)
Dept: NEUROLOGY | Facility: CLINIC | Age: 74
End: 2024-12-11
Payer: MEDICARE

## 2024-12-11 VITALS
SYSTOLIC BLOOD PRESSURE: 123 MMHG | HEART RATE: 74 BPM | WEIGHT: 123 LBS | BODY MASS INDEX: 24.15 KG/M2 | HEIGHT: 60 IN | DIASTOLIC BLOOD PRESSURE: 75 MMHG

## 2024-12-11 DIAGNOSIS — R41.3 OTHER AMNESIA: ICD-10-CM

## 2024-12-11 PROCEDURE — 99204 OFFICE O/P NEW MOD 45 MIN: CPT

## 2025-01-28 NOTE — DISCHARGE NOTE ADULT - ADDITIONAL INSTRUCTIONS
Addended by: BRIDGETTE RHODES on: 1/28/2025 11:22 AM     Modules accepted: Level of Service     follow up with primary care  follow up with cardio

## 2025-08-01 ENCOUNTER — APPOINTMENT (OUTPATIENT)
Dept: ORTHOPEDIC SURGERY | Facility: CLINIC | Age: 75
End: 2025-08-01
Payer: MEDICARE

## 2025-08-01 VITALS
WEIGHT: 123 LBS | HEIGHT: 60 IN | BODY MASS INDEX: 24.15 KG/M2 | SYSTOLIC BLOOD PRESSURE: 131 MMHG | DIASTOLIC BLOOD PRESSURE: 77 MMHG | HEART RATE: 69 BPM

## 2025-08-01 DIAGNOSIS — M60.89: ICD-10-CM

## 2025-08-01 DIAGNOSIS — M47.812 SPONDYLOSIS W/OUT MYELOPATHY OR RADICULOPATHY, CERVICAL REGION: ICD-10-CM

## 2025-08-01 PROCEDURE — 72040 X-RAY EXAM NECK SPINE 2-3 VW: CPT

## 2025-08-01 PROCEDURE — 99203 OFFICE O/P NEW LOW 30 MIN: CPT

## 2025-08-01 RX ORDER — SIMVASTATIN 80 MG/1
TABLET, FILM COATED ORAL
Refills: 0 | Status: ACTIVE | COMMUNITY

## 2025-08-01 RX ORDER — DENOSUMAB 60 MG/ML
INJECTION SUBCUTANEOUS
Refills: 0 | Status: ACTIVE | COMMUNITY

## 2025-08-01 RX ORDER — METHYLPREDNISOLONE 4 MG/1
4 TABLET ORAL
Qty: 1 | Refills: 0 | Status: ACTIVE | COMMUNITY
Start: 2025-08-01 | End: 1900-01-01